# Patient Record
Sex: FEMALE | Race: WHITE | HISPANIC OR LATINO
[De-identification: names, ages, dates, MRNs, and addresses within clinical notes are randomized per-mention and may not be internally consistent; named-entity substitution may affect disease eponyms.]

---

## 2018-01-28 ENCOUNTER — CHARTING TRANS (OUTPATIENT)
Dept: OTHER | Age: 28
End: 2018-01-28

## 2018-02-11 ENCOUNTER — LAB REQUISITION (OUTPATIENT)
Dept: ADMINISTRATIVE | Age: 28
End: 2018-02-11
Payer: COMMERCIAL

## 2018-02-11 DIAGNOSIS — F39 MOOD DISORDER (HCC): ICD-10-CM

## 2018-02-11 PROCEDURE — 80053 COMPREHEN METABOLIC PANEL: CPT | Performed by: OTHER

## 2018-02-11 PROCEDURE — 81001 URINALYSIS AUTO W/SCOPE: CPT | Performed by: OTHER

## 2018-02-11 PROCEDURE — 85025 COMPLETE CBC W/AUTO DIFF WBC: CPT | Performed by: OTHER

## 2018-02-11 PROCEDURE — 84443 ASSAY THYROID STIM HORMONE: CPT | Performed by: OTHER

## 2018-02-11 PROCEDURE — 81025 URINE PREGNANCY TEST: CPT | Performed by: OTHER

## 2018-02-11 PROCEDURE — 36415 COLL VENOUS BLD VENIPUNCTURE: CPT | Performed by: OTHER

## 2018-02-12 LAB
B-HCG UR QL: NEGATIVE
BILIRUB UR QL: NEGATIVE
COLOR UR: YELLOW
GLUCOSE UR-MCNC: NEGATIVE MG/DL
LEUKOCYTE ESTERASE UR QL STRIP.AUTO: NEGATIVE
NITRITE UR QL STRIP.AUTO: NEGATIVE
PH UR: 7 [PH] (ref 5–8)
PROT UR-MCNC: NEGATIVE MG/DL
RBC #/AREA URNS AUTO: 6 /HPF
SP GR UR STRIP: 1.02 (ref 1–1.03)
UROBILINOGEN UR STRIP-ACNC: <2
VIT C UR-MCNC: NEGATIVE MG/DL
WBC #/AREA URNS AUTO: 2 /HPF

## 2018-02-13 LAB
ALBUMIN SERPL BCP-MCNC: 4.2 G/DL (ref 3.5–4.8)
ALBUMIN/GLOB SERPL: 1.4 {RATIO} (ref 1–2)
ALP SERPL-CCNC: 62 U/L (ref 32–100)
ALT SERPL-CCNC: 24 U/L (ref 14–54)
ANION GAP SERPL CALC-SCNC: 10 MMOL/L (ref 0–18)
AST SERPL-CCNC: 386 U/L (ref 15–41)
BASOPHILS # BLD: 0.1 K/UL (ref 0–0.2)
BASOPHILS NFR BLD: 1 %
BILIRUB SERPL-MCNC: 1.3 MG/DL (ref 0.3–1.2)
BUN SERPL-MCNC: 8 MG/DL (ref 8–20)
BUN/CREAT SERPL: 9.8 (ref 10–20)
CALCIUM SERPL-MCNC: 9.5 MG/DL (ref 8.5–10.5)
CHLORIDE SERPL-SCNC: 104 MMOL/L (ref 95–110)
CO2 SERPL-SCNC: 26 MMOL/L (ref 22–32)
CREAT SERPL-MCNC: 0.82 MG/DL (ref 0.5–1.5)
EOSINOPHIL # BLD: 0.1 K/UL (ref 0–0.7)
EOSINOPHIL NFR BLD: 1 %
ERYTHROCYTE [DISTWIDTH] IN BLOOD BY AUTOMATED COUNT: 12.8 % (ref 11–15)
GLOBULIN PLAS-MCNC: 2.9 G/DL (ref 2.5–3.7)
GLUCOSE SERPL-MCNC: 90 MG/DL (ref 70–99)
HCT VFR BLD AUTO: 42.1 % (ref 35–48)
HGB BLD-MCNC: 14.3 G/DL (ref 12–16)
LYMPHOCYTES # BLD: 2.7 K/UL (ref 1–4)
LYMPHOCYTES NFR BLD: 44 %
MCH RBC QN AUTO: 30.2 PG (ref 27–32)
MCHC RBC AUTO-ENTMCNC: 34 G/DL (ref 32–37)
MCV RBC AUTO: 88.9 FL (ref 80–100)
MONOCYTES # BLD: 0.5 K/UL (ref 0–1)
MONOCYTES NFR BLD: 8 %
NEUTROPHILS # BLD AUTO: 2.9 K/UL (ref 1.8–7.7)
NEUTROPHILS NFR BLD: 46 %
OSMOLALITY UR CALC.SUM OF ELEC: 288 MOSM/KG (ref 275–295)
PLATELET # BLD AUTO: 201 K/UL (ref 140–400)
PMV BLD AUTO: 12.9 FL (ref 7.4–10.3)
POTASSIUM SERPL-SCNC: 4.1 MMOL/L (ref 3.3–5.1)
PROT SERPL-MCNC: 7.1 G/DL (ref 5.9–8.4)
RBC # BLD AUTO: 4.74 M/UL (ref 3.7–5.4)
SODIUM SERPL-SCNC: 140 MMOL/L (ref 136–144)
TSH SERPL-ACNC: 0.59 UIU/ML (ref 0.45–5.33)
WBC # BLD AUTO: 6.2 K/UL (ref 4–11)

## 2018-02-15 ENCOUNTER — LAB REQUISITION (OUTPATIENT)
Dept: ADMINISTRATIVE | Age: 28
End: 2018-02-15
Payer: COMMERCIAL

## 2018-02-15 DIAGNOSIS — F33.2 ENDOGENOUS DEPRESSION (HCC): ICD-10-CM

## 2018-02-16 LAB
BNP SERPL-MCNC: 51 PG/ML (ref 0–100)
CRP SERPL-MCNC: <0.5 MG/DL (ref 0–0.9)
GGT SERPL-CCNC: 15 U/L (ref 7–50)
TROPONIN I SERPL-MCNC: 0 NG/ML (ref ?–0.03)

## 2018-02-16 PROCEDURE — 84484 ASSAY OF TROPONIN QUANT: CPT | Performed by: OTHER

## 2018-02-16 PROCEDURE — 82977 ASSAY OF GGT: CPT | Performed by: OTHER

## 2018-02-16 PROCEDURE — 36415 COLL VENOUS BLD VENIPUNCTURE: CPT | Performed by: OTHER

## 2018-02-16 PROCEDURE — 83880 ASSAY OF NATRIURETIC PEPTIDE: CPT | Performed by: OTHER

## 2018-02-16 PROCEDURE — 82085 ASSAY OF ALDOLASE: CPT | Performed by: OTHER

## 2018-02-16 PROCEDURE — 86255 FLUORESCENT ANTIBODY SCREEN: CPT | Performed by: OTHER

## 2018-02-16 PROCEDURE — 86038 ANTINUCLEAR ANTIBODIES: CPT | Performed by: OTHER

## 2018-02-16 PROCEDURE — 86140 C-REACTIVE PROTEIN: CPT | Performed by: OTHER

## 2018-02-18 LAB — ALDOLASE, SERUM: 2.9 U/L

## 2018-02-19 LAB — NUCLEAR IGG TITR SER IF: NEGATIVE {TITER}

## 2018-02-20 LAB — MITOCHONDRIA AB TITR SER: <20 {TITER}

## 2018-05-08 PROCEDURE — 87591 N.GONORRHOEAE DNA AMP PROB: CPT | Performed by: NURSE PRACTITIONER

## 2018-05-08 PROCEDURE — 87491 CHLMYD TRACH DNA AMP PROBE: CPT | Performed by: NURSE PRACTITIONER

## 2018-05-08 PROCEDURE — 87660 TRICHOMONAS VAGIN DIR PROBE: CPT | Performed by: NURSE PRACTITIONER

## 2018-05-08 PROCEDURE — 87480 CANDIDA DNA DIR PROBE: CPT | Performed by: NURSE PRACTITIONER

## 2018-05-08 PROCEDURE — 87510 GARDNER VAG DNA DIR PROBE: CPT | Performed by: NURSE PRACTITIONER

## 2018-05-21 PROCEDURE — 87510 GARDNER VAG DNA DIR PROBE: CPT | Performed by: NURSE PRACTITIONER

## 2018-05-21 PROCEDURE — 87660 TRICHOMONAS VAGIN DIR PROBE: CPT | Performed by: NURSE PRACTITIONER

## 2018-05-21 PROCEDURE — 87480 CANDIDA DNA DIR PROBE: CPT | Performed by: NURSE PRACTITIONER

## 2018-09-20 ENCOUNTER — CHARTING TRANS (OUTPATIENT)
Dept: OTHER | Age: 28
End: 2018-09-20

## 2019-06-13 PROCEDURE — 88175 CYTOPATH C/V AUTO FLUID REDO: CPT | Performed by: NURSE PRACTITIONER

## 2019-08-06 ENCOUNTER — OFFICE VISIT (OUTPATIENT)
Dept: PERINATAL CARE | Facility: HOSPITAL | Age: 29
End: 2019-08-06
Attending: OBSTETRICS & GYNECOLOGY
Payer: COMMERCIAL

## 2019-08-06 VITALS
HEART RATE: 79 BPM | HEIGHT: 68 IN | SYSTOLIC BLOOD PRESSURE: 102 MMHG | WEIGHT: 194 LBS | DIASTOLIC BLOOD PRESSURE: 69 MMHG | BODY MASS INDEX: 29.4 KG/M2

## 2019-08-06 DIAGNOSIS — F31.81 BIPOLAR 2 DISORDER (HCC): ICD-10-CM

## 2019-08-06 DIAGNOSIS — R74.8 ELEVATED LIVER ENZYMES: ICD-10-CM

## 2019-08-06 DIAGNOSIS — Z86.718 HISTORY OF MATERNAL DEEP VEIN THROMBOSIS (DVT): ICD-10-CM

## 2019-08-06 DIAGNOSIS — Z87.59 HISTORY OF MATERNAL DEEP VEIN THROMBOSIS (DVT): ICD-10-CM

## 2019-08-06 PROCEDURE — 76811 OB US DETAILED SNGL FETUS: CPT | Performed by: OBSTETRICS & GYNECOLOGY

## 2019-08-06 PROCEDURE — 99244 OFF/OP CNSLTJ NEW/EST MOD 40: CPT | Performed by: OBSTETRICS & GYNECOLOGY

## 2019-08-06 NOTE — PROGRESS NOTES
Indication: elevated liver enzymes, hx dvt-pe, bipolar.   ____________________________________________________________________________  History: Age: 29 years.  : 1 Para: 0.  _________________________________________________________________________ fetus.    ____________________________________________________________________________  Maternal Structures:  Cervix: normal. Cervical length: 38 mm.  ____________________________________________________________________________  Comments:    Emeterio Horn, activated protein C was shown in one study to be associated with an increased risk for pregnancy-related venous thrombosis.  Activity of the fibrinolytic inhibitors MAHAD-1 and MAHAD-2 is increased during pregnancy, although total fibrinolytic activity may not had a workup for elevated AST but no etiology found. Liver biopsy in 2013. AST was 386 in 2018.       Bipolar disorder is a mood disorder that is characterized by episodes of jono, hypomania, and major depression, as well as mixed episodes (major depress consistent with dates     3. No ultrasound evidence of structural abnormalities are seen   4.  h/o DVT  5.  bipolar disorder  6.  unexplained elevated liver enzymes    Recommendations:    1. Weekly NST at  36 wks     2.  growth US  at 28 & 34wks   3.   ok

## 2019-10-03 ENCOUNTER — WALK IN (OUTPATIENT)
Dept: URGENT CARE | Age: 29
End: 2019-10-03

## 2019-10-03 DIAGNOSIS — Z23 FLU VACCINE NEED: Primary | ICD-10-CM

## 2019-10-03 DIAGNOSIS — Z23 NEED FOR TDAP VACCINATION: ICD-10-CM

## 2019-10-03 PROBLEM — Z86.718 HISTORY OF MATERNAL DEEP VEIN THROMBOSIS (DVT): Status: ACTIVE | Noted: 2019-10-03

## 2019-10-03 PROBLEM — Z87.59 HISTORY OF MATERNAL DEEP VEIN THROMBOSIS (DVT): Status: ACTIVE | Noted: 2019-10-03

## 2019-10-03 PROBLEM — R74.8 ELEVATED LIVER ENZYMES: Status: ACTIVE | Noted: 2019-10-03

## 2019-10-03 PROBLEM — J45.20 ASTHMA, MILD INTERMITTENT: Status: ACTIVE | Noted: 2018-09-20

## 2019-10-03 PROBLEM — F31.81 BIPOLAR 2 DISORDER (CMD): Status: ACTIVE | Noted: 2019-10-03

## 2019-10-03 PROCEDURE — 90472 IMMUNIZATION ADMIN EACH ADD: CPT | Performed by: NURSE PRACTITIONER

## 2019-10-03 PROCEDURE — 90471 IMMUNIZATION ADMIN: CPT | Performed by: NURSE PRACTITIONER

## 2019-10-03 PROCEDURE — 90686 IIV4 VACC NO PRSV 0.5 ML IM: CPT | Performed by: NURSE PRACTITIONER

## 2019-10-03 PROCEDURE — 90715 TDAP VACCINE 7 YRS/> IM: CPT | Performed by: NURSE PRACTITIONER

## 2019-10-03 RX ORDER — CHOLECALCIFEROL (VITAMIN D3) 25 MCG
1 TABLET,CHEWABLE ORAL
COMMUNITY

## 2019-10-03 RX ORDER — ALBUTEROL SULFATE 90 UG/1
1-2 AEROSOL, METERED RESPIRATORY (INHALATION) EVERY 6 HOURS PRN
COMMUNITY
Start: 2014-05-21

## 2019-10-03 RX ORDER — PROMETHAZINE HYDROCHLORIDE 12.5 MG/1
12.5 TABLET ORAL
COMMUNITY

## 2019-10-03 RX ORDER — ENOXAPARIN SODIUM 100 MG/ML
INJECTION SUBCUTANEOUS
COMMUNITY
Start: 2019-06-04

## 2019-10-03 RX ORDER — ACETAMINOPHEN 325 MG/1
650 TABLET ORAL
COMMUNITY